# Patient Record
Sex: MALE | URBAN - METROPOLITAN AREA
[De-identification: names, ages, dates, MRNs, and addresses within clinical notes are randomized per-mention and may not be internally consistent; named-entity substitution may affect disease eponyms.]

---

## 2021-07-24 ENCOUNTER — OFFICE VISIT (OUTPATIENT)
Dept: URBAN - METROPOLITAN AREA CLINIC 56 | Facility: CLINIC | Age: 76
End: 2021-07-24
Payer: MEDICARE

## 2021-07-24 DIAGNOSIS — E11.9 TYPE 2 DIABETES MELLITUS W/O COMPLICATION: Primary | ICD-10-CM

## 2021-07-24 DIAGNOSIS — H25.13 AGE-RELATED NUCLEAR CATARACT, BILATERAL: ICD-10-CM

## 2021-07-24 DIAGNOSIS — H35.373 PUCKERING OF MACULA, BILATERAL: ICD-10-CM

## 2021-07-24 DIAGNOSIS — Z79.84 LONG TERM (CURRENT) USE OF ORAL ANTIDIABETIC DRUGS: ICD-10-CM

## 2021-07-24 DIAGNOSIS — H11.041 PERIPHERAL PTERYGIUM, STATIONARY, RIGHT EYE: ICD-10-CM

## 2021-07-24 PROCEDURE — 92134 CPTRZ OPH DX IMG PST SGM RTA: CPT | Performed by: OPTOMETRIST

## 2021-07-24 PROCEDURE — 99204 OFFICE O/P NEW MOD 45 MIN: CPT | Performed by: OPTOMETRIST

## 2021-07-24 ASSESSMENT — INTRAOCULAR PRESSURE
OD: 14
OS: 14

## 2021-07-24 ASSESSMENT — VISUAL ACUITY
OS: 20/25
OD: 20/25

## 2021-07-24 ASSESSMENT — KERATOMETRY
OS: 41.84
OD: 42.03

## 2021-11-11 NOTE — IMPRESSION/PLAN
Impression: Age-related nuclear cataract, bilateral: H25.13. Plan: Discussed cataract diagnosis with the patient. Discussed and reviewed treatment options for cataracts. Surgical treatment is recommended for cataracts. Risks and benefits of surgical treatment were discussed and understood. Patient prefers to wait at this time. Patient education change in glasses will not improve vision.
Impression: Long term (current) use of oral antidiabetic drugs: Z79.84.  Plan: See E11.9
Impression: Peripheral pterygium, stationary, right eye: H11.041. Plan: Recommend ATs and UV protection.
Impression: Puckering of macula, bilateral: H35.373. Plan: Discussed findings with patient. Will monitor.
Impression: Type 2 diabetes mellitus w/o complication: N71.4. Plan: No diabetic retinopathy. Recommend yearly diabetic eye exam. Discussed with patient importance of good blood sugar control. 
RTC in 1 year for CE, MAC OCT and fundus photos
Statement Selected

## 2022-07-22 ENCOUNTER — OFFICE VISIT (OUTPATIENT)
Dept: URBAN - METROPOLITAN AREA CLINIC 56 | Facility: CLINIC | Age: 77
End: 2022-07-22
Payer: MEDICARE

## 2022-07-22 DIAGNOSIS — H43.813 VITREOUS DEGENERATION, BILATERAL: ICD-10-CM

## 2022-07-22 DIAGNOSIS — E11.9 TYPE 2 DIABETES MELLITUS WITHOUT COMPLICATIONS: Primary | ICD-10-CM

## 2022-07-22 DIAGNOSIS — H25.813 COMBINED FORMS OF AGE-RELATED CATARACT, BILATERAL: ICD-10-CM

## 2022-07-22 DIAGNOSIS — H35.373 PUCKERING OF MACULA, BILATERAL: ICD-10-CM

## 2022-07-22 DIAGNOSIS — Z79.84 LONG TERM (CURRENT) USE OF ORAL ANTIDIABETIC DRUGS: ICD-10-CM

## 2022-07-22 DIAGNOSIS — H11.041 PERIPHERAL PTERYGIUM, STATIONARY, RIGHT EYE: ICD-10-CM

## 2022-07-22 PROCEDURE — 99214 OFFICE O/P EST MOD 30 MIN: CPT | Performed by: OPTOMETRIST

## 2022-07-22 PROCEDURE — 92134 CPTRZ OPH DX IMG PST SGM RTA: CPT | Performed by: OPTOMETRIST

## 2022-07-22 PROCEDURE — 92250 FUNDUS PHOTOGRAPHY W/I&R: CPT | Performed by: OPTOMETRIST

## 2022-07-22 ASSESSMENT — VISUAL ACUITY
OS: 20/30
OD: 20/25

## 2022-07-22 ASSESSMENT — INTRAOCULAR PRESSURE
OS: 14
OD: 16

## 2022-07-22 ASSESSMENT — KERATOMETRY
OD: 41.82
OS: 41.98

## 2022-07-22 NOTE — IMPRESSION/PLAN
Impression: Combined forms of age-related cataract, bilateral: H25.813. Plan: Discussed cataract diagnosis with the patient. Discussed and reviewed treatment options for cataracts. Risks and benefits of surgical treatment were discussed and understood. Patient elects surgical treatment. Recommend surgery OU, OS first. Risk for inflammation, use Prednisone/Diclofenac drops. Patient is candidate/interested in multifocal, toric, standard, LenSx and ORA. Aim OD: plano. Aim OS: plano. Patient will need glasses for all near work, including computer.  Outcome of surgery limitations include:  Type 2 diabetes mellitus without complications, Peripheral pterygium, stationary, right eye, Puckering of macula, bilateral, and Vitreous degeneration, bilateral.

## 2022-07-22 NOTE — IMPRESSION/PLAN
Impression: Type 2 diabetes mellitus without complications: G54.6. Plan: No diabetic retinopathy. Recommend yearly diabetic eye exam. Discussed with patient importance of good blood sugar control.

## 2022-07-22 NOTE — IMPRESSION/PLAN
Impression: Puckering of macula, bilateral: H35.373. Plan: Discussed with patient. Most likely minimal impact on vision. If reduced vision after cataract surgery can consider PPVIT. Drops for inflammation for cataract surgery.

## 2022-07-22 NOTE — IMPRESSION/PLAN
Impression: Peripheral pterygium, stationary, right eye: H11.041. Plan: Not bothersome to patient. Recommend ATs and UV protection.